# Patient Record
Sex: FEMALE | Race: WHITE | NOT HISPANIC OR LATINO | Employment: FULL TIME | URBAN - METROPOLITAN AREA
[De-identification: names, ages, dates, MRNs, and addresses within clinical notes are randomized per-mention and may not be internally consistent; named-entity substitution may affect disease eponyms.]

---

## 2023-05-05 ENCOUNTER — OFFICE VISIT (OUTPATIENT)
Dept: PRIMARY CARE | Facility: CLINIC | Age: 43
End: 2023-05-05
Payer: COMMERCIAL

## 2023-05-05 VITALS
BODY MASS INDEX: 23.23 KG/M2 | WEIGHT: 148 LBS | SYSTOLIC BLOOD PRESSURE: 153 MMHG | OXYGEN SATURATION: 98 % | HEIGHT: 67 IN | DIASTOLIC BLOOD PRESSURE: 93 MMHG | HEART RATE: 72 BPM

## 2023-05-05 DIAGNOSIS — M54.6 ACUTE RIGHT-SIDED THORACIC BACK PAIN: Primary | ICD-10-CM

## 2023-05-05 DIAGNOSIS — L40.9 PSORIASIS: ICD-10-CM

## 2023-05-05 DIAGNOSIS — F41.9 ANXIETY: ICD-10-CM

## 2023-05-05 PROCEDURE — 1036F TOBACCO NON-USER: CPT | Performed by: FAMILY MEDICINE

## 2023-05-05 PROCEDURE — 99214 OFFICE O/P EST MOD 30 MIN: CPT | Performed by: FAMILY MEDICINE

## 2023-05-05 RX ORDER — IBUPROFEN 800 MG/1
800 TABLET ORAL 3 TIMES DAILY PRN
Qty: 30 TABLET | Refills: 1 | Status: SHIPPED | OUTPATIENT
Start: 2023-05-05 | End: 2024-05-04

## 2023-05-05 RX ORDER — VALACYCLOVIR HYDROCHLORIDE 1 G/1
1 TABLET, FILM COATED ORAL 3 TIMES DAILY
Qty: 30 TABLET | Refills: 0 | COMMUNITY
Start: 2023-05-04 | End: 2023-05-14

## 2023-05-05 RX ORDER — HYDROCODONE BITARTRATE AND ACETAMINOPHEN 5; 325 MG/1; MG/1
1 TABLET ORAL EVERY 6 HOURS PRN
Qty: 15 TABLET | Refills: 0 | Status: SHIPPED | OUTPATIENT
Start: 2023-05-05 | End: 2023-05-12

## 2023-05-05 NOTE — PROGRESS NOTES
"Burning Right midback flank pain 3 days not associated with rash.  He is also complained of anxiety and tremor regarding this however she is had issues regarding stress and is seeing a counselor.  She is anxious on many days denies panic attacks denies palpitations syncope presyncope diaphoresis nausea chest pressure.  She went to [facility] ED for evaluation D-dimer was negative BMP was unremarkable she was placed on Valtrex and methocarbamol but felt sluggish and fatigued on methocarbamol so she stopped it she is continuing the Valtrex.  She has had persistent right burning pain radiating to her anterior denies abdominal pain nausea vomiting acholic stools heartburn.  She also has psoriasis which she occasionally treats with topical steroids but uses home remedies.  She has occasional hand pain but denies major joint pain.    BP (!) 153/93   Pulse 72   Ht 1.702 m (5' 7\")   Wt 67.1 kg (148 lb)   SpO2 98%   BMI 23.18 kg/m²       Appears comfortable but somewhat anxious  No retractions  Skin without pallor petechia icterus cyanosis  Psoriatic rash over left eyelid bilateral elbows and left knee  Neck without JVD thyromegaly bruits  Chest wall nontender  Chest clear to auscultation without rales rhonchi wheeze  Heart regular rate and rhythm without murmur  Abdomen soft nondistended nontender without organomegaly or mass    Extremities without erythema edema Homans or cord  Peripheral pulses palpable  Neuro intact  No paresthesias or dysesthesias  No active tremor        "

## 2023-05-05 NOTE — PATIENT INSTRUCTIONS
Stop methocarbamol  Continue Valtrex  Take Vicodin as needed only  Call if pain persists after the weekend as we are considering gabapentin  Use topical steroids  Follow-up with dermatology  Follow-up with behavioral health or her counselor as she prefers

## 2023-12-13 ENCOUNTER — TELEMEDICINE (OUTPATIENT)
Dept: PRIMARY CARE | Facility: CLINIC | Age: 43
End: 2023-12-13
Payer: COMMERCIAL

## 2023-12-13 DIAGNOSIS — J01.00 ACUTE MAXILLARY SINUSITIS, RECURRENCE NOT SPECIFIED: Primary | ICD-10-CM

## 2023-12-13 PROCEDURE — 99213 OFFICE O/P EST LOW 20 MIN: CPT | Performed by: FAMILY MEDICINE

## 2023-12-13 RX ORDER — FLUCONAZOLE 150 MG/1
150 TABLET ORAL ONCE
Qty: 1 TABLET | Refills: 2 | Status: SHIPPED | OUTPATIENT
Start: 2023-12-13 | End: 2023-12-13

## 2023-12-13 RX ORDER — AMOXICILLIN AND CLAVULANATE POTASSIUM 875; 125 MG/1; MG/1
875 TABLET, FILM COATED ORAL 2 TIMES DAILY
Qty: 20 TABLET | Refills: 0 | Status: SHIPPED | OUTPATIENT
Start: 2023-12-13 | End: 2023-12-23

## 2023-12-13 NOTE — PROGRESS NOTES
In light of the recent pandemic related to Coronavirus causing COVID-19 illness, and in  accordance with CDC guidelines which encourage social distancing, I have spoken with my  patient via telemedicine. They have no symptomatology which would necessitate an in-person visit.  Limitations of this modality  were explained to patient.  Patient understands these limitations and consents to visit.      43-year-old female with history of nasal congestion and green rhinorrhea facial pressure for 10 days without shortness of breath wheeze chest congestion myalgias arthralgias headache.  COVID test at home was negative    Appears nasally congested respirations are normal  Skin without pallor or cyanosis no audible wheezing

## 2024-08-22 ENCOUNTER — TELEPHONE (OUTPATIENT)
Dept: PRIMARY CARE | Facility: CLINIC | Age: 44
End: 2024-08-22
Payer: COMMERCIAL

## 2024-08-22 NOTE — TELEPHONE ENCOUNTER
States went to see her mother at nursing home, states the nursing home is a mess and unkept and that her mother has stage 4 bedsores. She states woke up this AM and now has a   Reddened area under nose and swollen and wanted to be seen today, doctor not in today. Advised to go to urgent and patient agreed.

## 2025-03-26 ENCOUNTER — TELEMEDICINE (OUTPATIENT)
Dept: PRIMARY CARE | Facility: CLINIC | Age: 45
End: 2025-03-26
Payer: MEDICAID

## 2025-03-26 DIAGNOSIS — J01.00 ACUTE MAXILLARY SINUSITIS, RECURRENCE NOT SPECIFIED: Primary | ICD-10-CM

## 2025-03-26 PROCEDURE — 1036F TOBACCO NON-USER: CPT | Performed by: FAMILY MEDICINE

## 2025-03-26 PROCEDURE — 99213 OFFICE O/P EST LOW 20 MIN: CPT | Performed by: FAMILY MEDICINE

## 2025-03-26 RX ORDER — AMOXICILLIN AND CLAVULANATE POTASSIUM 875; 125 MG/1; MG/1
875 TABLET, FILM COATED ORAL 2 TIMES DAILY
Qty: 20 TABLET | Refills: 0 | Status: SHIPPED | OUTPATIENT
Start: 2025-03-26 | End: 2025-04-05

## 2025-03-26 RX ORDER — FLUCONAZOLE 150 MG/1
150 TABLET ORAL ONCE
Qty: 1 TABLET | Refills: 0 | Status: SHIPPED | OUTPATIENT
Start: 2025-03-26 | End: 2025-03-26

## 2025-03-26 NOTE — PROGRESS NOTES
I have spoken with my  patient via telemedicine. They have no symptomatology which would necessitate an in-person visit.  Limitations of this modality  were explained to patient.  Patient understands these limitations and consents to visit.      Complains of nasal congestion left facial pressure teeth pain yellow rhinorrhea without otalgia otorrhea shortness of breath wheeze myalgias arthralgias headache neck pain or photophobia slight improvement with Sudafed    Appears nasally congested  Mildly ill but nontoxic-appearing  No audible cough or wheeze  Respirations are normal  Mental status normal

## 2025-04-16 LAB
NON-UH HIE FSH: 36.2 IU/L
NON-UH HIE PREGNANCY TEST, U: NORMAL
NON-UH HIE PROLACTIN: 6.1 NG/ML
NON-UH HIE U PREG INTERNAL QC: NORMAL

## 2025-07-08 ENCOUNTER — PATIENT OUTREACH (OUTPATIENT)
Dept: CARE COORDINATION | Facility: CLINIC | Age: 45
End: 2025-07-08
Payer: MEDICAID

## 2025-07-08 DIAGNOSIS — Z12.31 ENCOUNTER FOR SCREENING MAMMOGRAM FOR BREAST CANCER: ICD-10-CM

## 2025-08-08 ENCOUNTER — TELEPHONE (OUTPATIENT)
Dept: PRIMARY CARE | Facility: CLINIC | Age: 45
End: 2025-08-08
Payer: MEDICAID

## 2025-08-08 NOTE — TELEPHONE ENCOUNTER
Patient got stiches in her foot at the [facility] in [place]. She wanted to know if you would be able to take them out next week. Please advise.

## 2025-08-13 ENCOUNTER — APPOINTMENT (OUTPATIENT)
Dept: PRIMARY CARE | Facility: CLINIC | Age: 45
End: 2025-08-13
Payer: MEDICAID

## 2025-08-13 VITALS
BODY MASS INDEX: 25.27 KG/M2 | DIASTOLIC BLOOD PRESSURE: 87 MMHG | OXYGEN SATURATION: 99 % | HEART RATE: 73 BPM | HEIGHT: 67 IN | WEIGHT: 161 LBS | SYSTOLIC BLOOD PRESSURE: 134 MMHG | TEMPERATURE: 97.6 F

## 2025-08-13 DIAGNOSIS — S91.312D: Primary | ICD-10-CM

## 2025-08-13 PROCEDURE — 3008F BODY MASS INDEX DOCD: CPT | Performed by: FAMILY MEDICINE

## 2025-08-13 PROCEDURE — 99212 OFFICE O/P EST SF 10 MIN: CPT | Performed by: FAMILY MEDICINE

## 2025-08-13 PROCEDURE — 1036F TOBACCO NON-USER: CPT | Performed by: FAMILY MEDICINE

## 2025-08-13 RX ORDER — ESTRADIOL 0.5 MG/1
1 TABLET ORAL
COMMUNITY
Start: 2025-07-15

## 2025-08-13 RX ORDER — PROGESTERONE 200 MG/1
CAPSULE ORAL
COMMUNITY

## 2025-08-13 ASSESSMENT — PATIENT HEALTH QUESTIONNAIRE - PHQ9
1. LITTLE INTEREST OR PLEASURE IN DOING THINGS: NOT AT ALL
2. FEELING DOWN, DEPRESSED OR HOPELESS: NOT AT ALL
SUM OF ALL RESPONSES TO PHQ9 QUESTIONS 1 & 2: 0

## 2025-08-13 ASSESSMENT — LIFESTYLE VARIABLES
SKIP TO QUESTIONS 9-10: 1
AUDIT-C TOTAL SCORE: 0
HOW OFTEN DO YOU HAVE A DRINK CONTAINING ALCOHOL: NEVER
HOW OFTEN DO YOU HAVE SIX OR MORE DRINKS ON ONE OCCASION: NEVER
HOW MANY STANDARD DRINKS CONTAINING ALCOHOL DO YOU HAVE ON A TYPICAL DAY: PATIENT DOES NOT DRINK